# Patient Record
Sex: FEMALE | Race: WHITE | URBAN - METROPOLITAN AREA
[De-identification: names, ages, dates, MRNs, and addresses within clinical notes are randomized per-mention and may not be internally consistent; named-entity substitution may affect disease eponyms.]

---
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
## 2020-03-30 RX ORDER — NORGESTIMATE AND ETHINYL ESTRADIOL
KIT
Qty: 84 TABLET | Refills: 0 | OUTPATIENT
Start: 2020-03-30

## 2021-04-20 ENCOUNTER — IMMUNIZATION (OUTPATIENT)
Dept: LAB | Facility: HOSPITAL | Age: 21
End: 2021-04-20
Attending: EMERGENCY MEDICINE
Payer: COMMERCIAL

## 2021-04-20 DIAGNOSIS — Z23 NEED FOR VACCINATION: Primary | ICD-10-CM

## 2021-04-20 PROCEDURE — 0011A SARSCOV2 VAC 100MCG/0.5ML IM: CPT

## 2021-05-18 ENCOUNTER — IMMUNIZATION (OUTPATIENT)
Dept: LAB | Facility: HOSPITAL | Age: 21
End: 2021-05-18
Attending: EMERGENCY MEDICINE
Payer: COMMERCIAL

## 2021-05-18 DIAGNOSIS — Z23 NEED FOR VACCINATION: Primary | ICD-10-CM

## 2021-05-18 PROCEDURE — 0012A SARSCOV2 VAC 100MCG/0.5ML IM: CPT

## 2021-07-27 NOTE — PROGRESS NOTES
HPI/Subjective:   Patient ID: Hemant Mckinnon is a 21year old female. Patient with anxiety issues.  States that she has this for a long time would like to start counseling       History/Other:   Review of Systems   Constitutional: Positive for fatigue

## 2021-12-28 ENCOUNTER — IMMUNIZATION (OUTPATIENT)
Dept: LAB | Facility: HOSPITAL | Age: 21
End: 2021-12-28
Attending: EMERGENCY MEDICINE
Payer: COMMERCIAL

## 2021-12-28 DIAGNOSIS — Z23 NEED FOR VACCINATION: Primary | ICD-10-CM

## 2021-12-28 PROCEDURE — 0064A SARSCOV2 VAC 50MCG/0.25ML IM: CPT

## 2023-04-04 ENCOUNTER — OFFICE VISIT (OUTPATIENT)
Dept: FAMILY MEDICINE CLINIC | Facility: CLINIC | Age: 23
End: 2023-04-04

## 2023-04-04 VITALS
SYSTOLIC BLOOD PRESSURE: 114 MMHG | RESPIRATION RATE: 22 BRPM | HEART RATE: 72 BPM | DIASTOLIC BLOOD PRESSURE: 60 MMHG | HEIGHT: 64.5 IN | WEIGHT: 127 LBS | BODY MASS INDEX: 21.42 KG/M2

## 2023-04-04 DIAGNOSIS — L03.90 CELLULITIS, UNSPECIFIED CELLULITIS SITE: Primary | ICD-10-CM

## 2023-04-04 PROCEDURE — 3074F SYST BP LT 130 MM HG: CPT | Performed by: FAMILY MEDICINE

## 2023-04-04 PROCEDURE — 3008F BODY MASS INDEX DOCD: CPT | Performed by: FAMILY MEDICINE

## 2023-04-04 PROCEDURE — 3078F DIAST BP <80 MM HG: CPT | Performed by: FAMILY MEDICINE

## 2023-04-04 PROCEDURE — 99213 OFFICE O/P EST LOW 20 MIN: CPT | Performed by: FAMILY MEDICINE

## 2023-04-04 RX ORDER — CEFADROXIL 500 MG/1
500 CAPSULE ORAL 2 TIMES DAILY
Qty: 20 CAPSULE | Refills: 0 | Status: SHIPPED | OUTPATIENT
Start: 2023-04-04

## 2023-06-19 ENCOUNTER — OFFICE VISIT (OUTPATIENT)
Dept: FAMILY MEDICINE CLINIC | Facility: CLINIC | Age: 23
End: 2023-06-19

## 2023-06-19 VITALS
TEMPERATURE: 99 F | BODY MASS INDEX: 21 KG/M2 | WEIGHT: 123 LBS | DIASTOLIC BLOOD PRESSURE: 64 MMHG | HEIGHT: 64 IN | SYSTOLIC BLOOD PRESSURE: 106 MMHG

## 2023-06-19 DIAGNOSIS — M54.50 ACUTE LEFT-SIDED LOW BACK PAIN WITHOUT SCIATICA: ICD-10-CM

## 2023-06-19 DIAGNOSIS — L03.90 CELLULITIS, UNSPECIFIED CELLULITIS SITE: Primary | ICD-10-CM

## 2023-06-19 PROBLEM — G47.20 SLEEP PATTERN DISTURBANCE: Status: ACTIVE | Noted: 2022-08-31

## 2023-06-19 PROBLEM — M99.02 SEGMENTAL DYSFUNCTION OF THORACIC REGION: Status: ACTIVE | Noted: 2023-01-03

## 2023-06-19 PROBLEM — M99.01 SEGMENTAL DYSFUNCTION OF CERVICAL REGION: Status: ACTIVE | Noted: 2023-01-03

## 2023-06-19 PROBLEM — F41.9 ANXIETY: Status: ACTIVE | Noted: 2021-07-28

## 2023-06-19 PROCEDURE — 3078F DIAST BP <80 MM HG: CPT

## 2023-06-19 PROCEDURE — 3008F BODY MASS INDEX DOCD: CPT

## 2023-06-19 PROCEDURE — 99213 OFFICE O/P EST LOW 20 MIN: CPT

## 2023-06-19 PROCEDURE — 3074F SYST BP LT 130 MM HG: CPT

## 2023-06-19 RX ORDER — IBUPROFEN 600 MG/1
600 TABLET ORAL EVERY 6 HOURS PRN
Qty: 18 TABLET | Refills: 0 | Status: SHIPPED | OUTPATIENT
Start: 2023-06-19

## 2023-06-19 RX ORDER — CEFADROXIL 500 MG/1
500 CAPSULE ORAL 2 TIMES DAILY
Qty: 20 CAPSULE | Refills: 0 | Status: SHIPPED | OUTPATIENT
Start: 2023-06-19

## 2024-01-23 ENCOUNTER — OFFICE VISIT (OUTPATIENT)
Dept: FAMILY MEDICINE CLINIC | Facility: CLINIC | Age: 24
End: 2024-01-23
Payer: COMMERCIAL

## 2024-01-23 VITALS
RESPIRATION RATE: 18 BRPM | OXYGEN SATURATION: 97 % | TEMPERATURE: 98 F | DIASTOLIC BLOOD PRESSURE: 62 MMHG | WEIGHT: 123 LBS | BODY MASS INDEX: 21 KG/M2 | SYSTOLIC BLOOD PRESSURE: 108 MMHG | HEART RATE: 86 BPM | HEIGHT: 64 IN

## 2024-01-23 DIAGNOSIS — K11.20 SALIVARY GLAND INFECTION: Primary | ICD-10-CM

## 2024-01-23 DIAGNOSIS — Z87.2 HISTORY OF INFECTION OF SKIN OR SUBCUTANEOUS TISSUE: ICD-10-CM

## 2024-01-23 PROCEDURE — 3008F BODY MASS INDEX DOCD: CPT

## 2024-01-23 PROCEDURE — 3078F DIAST BP <80 MM HG: CPT

## 2024-01-23 PROCEDURE — 3074F SYST BP LT 130 MM HG: CPT

## 2024-01-23 PROCEDURE — 99213 OFFICE O/P EST LOW 20 MIN: CPT

## 2024-01-23 RX ORDER — AMOXICILLIN AND CLAVULANATE POTASSIUM 875; 125 MG/1; MG/1
1 TABLET, FILM COATED ORAL 2 TIMES DAILY
Qty: 14 TABLET | Refills: 0 | Status: SHIPPED | OUTPATIENT
Start: 2024-01-23 | End: 2024-01-30

## 2024-01-23 NOTE — PROGRESS NOTES
HPI:    Patient ID: Aleah Starkey is a 23 year old female.    HPI     Patient here in office with complaint of left jaw pain, rated 8/10, started on Tuesday shortly after smoking.  Also complains of jaw stiffness and pain when eating.  Denies dental caries.  Has tried acupuncture and oral massage with no improvement in symptoms.      Requesting prophylactic antibiotic for anticipated cellulitis s/p tattoo removal ( has upcoming appointment).  Was seen on 6/19/2023 and given antibiotics due to cellulitis during tattoo placement.        Review of Systems   Constitutional: Negative.  Negative for fever.   HENT:          Left jaw pain   Respiratory: Negative.     Cardiovascular: Negative.    Skin: Negative.    Neurological: Negative.    Psychiatric/Behavioral: Negative.              Current Outpatient Medications   Medication Sig Dispense Refill    SM MELATONIN-LEMON BALM OR Take by mouth.      MILK THISTLE OR Take by mouth.      L-THEANINE OR Take by mouth.      amoxicillin clavulanate 875-125 MG Oral Tab Take 1 tablet by mouth 2 (two) times daily for 7 days. 14 tablet 0    ibuprofen 600 MG Oral Tab Take 1 tablet (600 mg total) by mouth every 6 (six) hours as needed for Pain. 18 tablet 0    Emollient (COLLAGEN EX) Apply topically.      Ergocalciferol (VITAMIN D OR) Take by mouth.      Ascorbic Acid (VITAMIN C ER OR) Take by mouth.       Allergies:No Known Allergies   /62   Pulse 86   Temp 97.8 °F (36.6 °C) (Oral)   Resp 18   Ht 5' 4\" (1.626 m)   Wt 123 lb (55.8 kg)   LMP 01/14/2024   SpO2 97%   BMI 21.11 kg/m²   Body mass index is 21.11 kg/m².  PHYSICAL EXAM:   Physical Exam  Vitals reviewed.   Constitutional:       General: She is not in acute distress.     Appearance: Normal appearance. She is not ill-appearing.   HENT:      Right Ear: Tympanic membrane, ear canal and external ear normal. There is no impacted cerumen.      Left Ear: Tympanic membrane, ear canal and external ear normal. There is no  impacted cerumen.      Nose: Nose normal. No congestion.      Mouth/Throat:      Mouth: Mucous membranes are moist.      Pharynx: No oropharyngeal exudate or posterior oropharyngeal erythema.      Comments: Left parotid gland tenderness, no TMJ tenderness  Eyes:      General:         Right eye: No discharge.         Left eye: No discharge.      Conjunctiva/sclera: Conjunctivae normal.   Cardiovascular:      Rate and Rhythm: Normal rate and regular rhythm.      Heart sounds: Normal heart sounds. No murmur heard.     No friction rub. No gallop.   Pulmonary:      Effort: Pulmonary effort is normal. No respiratory distress.      Breath sounds: Normal breath sounds. No stridor. No wheezing, rhonchi or rales.   Chest:      Chest wall: No tenderness.   Musculoskeletal:      Cervical back: Neck supple.   Lymphadenopathy:      Cervical: No cervical adenopathy.   Skin:     General: Skin is warm.      Findings: No rash.   Neurological:      General: No focal deficit present.      Mental Status: She is alert and oriented to person, place, and time.   Psychiatric:         Mood and Affect: Mood normal.         Behavior: Behavior normal.         Thought Content: Thought content normal.         Judgment: Judgment normal.                ASSESSMENT/PLAN:   1. Salivary gland infection  - amoxicillin clavulanate 875-125 MG Oral Tab, Take 1 tablet by mouth 2 (two) times daily for 7 days.   -Return to office if symptoms worsening/not improving    2. History of infection of skin or subcutaneous tissue  -Advised patient that APRN unable to prescribe antibiotic due to infection not being present at the moment  - Encouraged patient to schedule office/video visit if infection of the skin develops s/p tattoo removal      No orders of the defined types were placed in this encounter.      Meds This Visit:  Requested Prescriptions     Signed Prescriptions Disp Refills    amoxicillin clavulanate 875-125 MG Oral Tab 14 tablet 0     Sig: Take 1  tablet by mouth 2 (two) times daily for 7 days.       Imaging & Referrals:  None         ID#[id number]

## 2024-01-29 ENCOUNTER — OFFICE VISIT (OUTPATIENT)
Dept: FAMILY MEDICINE CLINIC | Facility: CLINIC | Age: 24
End: 2024-01-29
Payer: COMMERCIAL

## 2024-01-29 VITALS
HEIGHT: 64 IN | BODY MASS INDEX: 21 KG/M2 | WEIGHT: 123 LBS | SYSTOLIC BLOOD PRESSURE: 126 MMHG | HEART RATE: 69 BPM | DIASTOLIC BLOOD PRESSURE: 68 MMHG | TEMPERATURE: 98 F

## 2024-01-29 DIAGNOSIS — R68.84 JAW PAIN: Primary | ICD-10-CM

## 2024-01-29 PROCEDURE — 3078F DIAST BP <80 MM HG: CPT

## 2024-01-29 PROCEDURE — 3074F SYST BP LT 130 MM HG: CPT

## 2024-01-29 PROCEDURE — 3008F BODY MASS INDEX DOCD: CPT

## 2024-01-29 PROCEDURE — 99213 OFFICE O/P EST LOW 20 MIN: CPT

## 2024-01-29 RX ORDER — IBUPROFEN 800 MG/1
800 TABLET ORAL EVERY 8 HOURS PRN
Qty: 15 TABLET | Refills: 0 | Status: SHIPPED | OUTPATIENT
Start: 2024-01-29

## 2024-01-29 NOTE — PROGRESS NOTES
HPI:    Patient ID: Aleah Starkey is a 23 year old female.    HPI     Patient here in office for follow-up regarding left jaw pain.  States left jaw pain not improved, rated 7-9/10 (described as soreness/stiffness).  Was seen on 1/23/2024 and given Augmentin for suspected salivary gland infection with no improvement in symptoms.  States she went to acupuncture, did not assist with pain.      Review of Systems   Constitutional: Negative.    HENT:          Left jaw pain   Respiratory: Negative.     Cardiovascular: Negative.    Skin: Negative.    Neurological: Negative.    Psychiatric/Behavioral: Negative.              Current Outpatient Medications   Medication Sig Dispense Refill    ibuprofen 800 MG Oral Tab Take 1 tablet (800 mg total) by mouth every 8 (eight) hours as needed for Pain. 15 tablet 0    SM MELATONIN-LEMON BALM OR Take by mouth.      MILK THISTLE OR Take by mouth.      L-THEANINE OR Take by mouth.      amoxicillin clavulanate 875-125 MG Oral Tab Take 1 tablet by mouth 2 (two) times daily for 7 days. 14 tablet 0    ibuprofen 600 MG Oral Tab Take 1 tablet (600 mg total) by mouth every 6 (six) hours as needed for Pain. 18 tablet 0    Emollient (COLLAGEN EX) Apply topically.      Ergocalciferol (VITAMIN D OR) Take by mouth.      Ascorbic Acid (VITAMIN C ER OR) Take by mouth.       Allergies:No Known Allergies   /68   Pulse 69   Temp 97.5 °F (36.4 °C) (Tympanic)   Ht 5' 4\" (1.626 m)   Wt 123 lb (55.8 kg)   LMP 01/14/2024   BMI 21.11 kg/m²   Body mass index is 21.11 kg/m².  PHYSICAL EXAM:   Physical Exam  Vitals reviewed.   Constitutional:       General: She is not in acute distress.     Appearance: Normal appearance. She is not ill-appearing.   Cardiovascular:      Rate and Rhythm: Normal rate and regular rhythm.      Heart sounds: Normal heart sounds. No murmur heard.     No friction rub. No gallop.   Pulmonary:      Effort: Pulmonary effort is normal. No respiratory distress.      Breath  sounds: Normal breath sounds. No stridor. No wheezing, rhonchi or rales.   Chest:      Chest wall: No tenderness.   Musculoskeletal:         General: Tenderness present.      Cervical back: Neck supple.      Comments: Left TMJ tenderness   Lymphadenopathy:      Cervical: No cervical adenopathy.   Skin:     General: Skin is warm.      Findings: No rash.   Neurological:      General: No focal deficit present.      Mental Status: She is alert and oriented to person, place, and time.   Psychiatric:         Mood and Affect: Mood normal.         Behavior: Behavior normal.         Thought Content: Thought content normal.         Judgment: Judgment normal.                ASSESSMENT/PLAN:   1. Jaw pain  -Take ibuprofen 800 MG Oral Tab, Take 1 tablet (800 mg total) by mouth every 8 (eight) hours as needed for Pain X 2-3 days  -ENT referral given if symptoms worsen/do not improve  - ENT Referral - In Network      No orders of the defined types were placed in this encounter.      Meds This Visit:  Requested Prescriptions     Signed Prescriptions Disp Refills    ibuprofen 800 MG Oral Tab 15 tablet 0     Sig: Take 1 tablet (800 mg total) by mouth every 8 (eight) hours as needed for Pain.       Imaging & Referrals:  ENT - INTERNAL         ID#[id number]

## 2024-02-06 ENCOUNTER — OFFICE VISIT (OUTPATIENT)
Dept: OTOLARYNGOLOGY | Facility: CLINIC | Age: 24
End: 2024-02-06
Payer: COMMERCIAL

## 2024-02-06 DIAGNOSIS — M26.609 TMJ (TEMPOROMANDIBULAR JOINT DISORDER): Primary | ICD-10-CM

## 2024-02-06 PROCEDURE — 99203 OFFICE O/P NEW LOW 30 MIN: CPT | Performed by: STUDENT IN AN ORGANIZED HEALTH CARE EDUCATION/TRAINING PROGRAM

## 2024-02-06 RX ORDER — CYCLOBENZAPRINE HCL 5 MG
5 TABLET ORAL NIGHTLY
Qty: 30 TABLET | Refills: 0 | Status: SHIPPED | OUTPATIENT
Start: 2024-02-06 | End: 2024-03-07

## 2024-02-06 RX ORDER — PREDNISONE 20 MG/1
40 TABLET ORAL DAILY
Qty: 10 TABLET | Refills: 0 | Status: SHIPPED | OUTPATIENT
Start: 2024-02-06 | End: 2024-02-11

## 2024-02-06 NOTE — PROGRESS NOTES
Aleah Starkey is a 23 year old female.   Chief Complaint   Patient presents with    Jaw Pain     C/o jaw pain and left jaw locking X 3 weeks        ASSESSMENT AND PLAN:   1. TMJ (temporomandibular joint disorder)  23-year-old presents saying that her left jaw has been stuck and locked for the last 3 weeks.  She tried an anti-inflammatory by an outside provider that did not help.  She was initially thought to have a possible salivary issue on the side and was put on oral antibiotics.  She states that when her anxiety works up her jaw issue worsens.  She has had needle therapy to her jaw as well    Exam with slight trismus she is still able to open her mouth but she says it is not as much as usual.  Slight tenderness of her left temporomandibular joint area.  No apparent salivary issue no apparent tonsillitis    Will prescribe a muscle relaxant and short course of oral steroids.  Discussed these medications with her including pertinent side effects.  She may be able to undergo physical therapy at the place where she was getting the dry needling performed.  If she needs an order she can have them fax over an order and I can sign it we can fax it back.  She continues to have jaw opening difficulties she would need to see an oral surgeon.    Consult from Dr. De La Cruz regarding jaw pain    The patient indicates understanding of these issues and agrees to the plan.      EXAM:   [facility] 01/14/2024     Pertinent exam findings may also be noted above in assessment and plan     System Details   Skin Inspection - Normal.   Constitutional Overall appearance - Normal.   Head/Face Symmetric, TMJ tenderness not present    Eyes EOMI, PERRL   Right ear:  Canal clear, TM intact, no ROCCO   Left ear:  Canal clear, TM intact, no ROCCO   Nose: Septum midline, inferior turbinates not enlarged, nasal valves without collapse    Oral cavity/Oropharynx: No lesions or masses on inspection or palpation, tonsils symmetric    Neck: Soft without LAD,  thyroid not enlarged  Voice clear/ no stridor   Other:      Scopes and Procedures:             Current Outpatient Medications   Medication Sig Dispense Refill    cyclobenzaprine 5 MG Oral Tab Take 1 tablet (5 mg total) by mouth nightly. 30 tablet 0    predniSONE 20 MG Oral Tab Take 2 tablets (40 mg total) by mouth daily for 5 days. 10 tablet 0    ibuprofen 800 MG Oral Tab Take 1 tablet (800 mg total) by mouth every 8 (eight) hours as needed for Pain. 15 tablet 0    SM MELATONIN-LEMON BALM OR Take by mouth.      MILK THISTLE OR Take by mouth.      L-THEANINE OR Take by mouth.      ibuprofen 600 MG Oral Tab Take 1 tablet (600 mg total) by mouth every 6 (six) hours as needed for Pain. 18 tablet 0    Emollient (COLLAGEN EX) Apply topically.      Ergocalciferol (VITAMIN D OR) Take by mouth.      Ascorbic Acid (VITAMIN C ER OR) Take by mouth.        History reviewed. No pertinent past medical history.   Social History:  Social History     Socioeconomic History    Marital status: Single   Tobacco Use    Smoking status: Never    Smokeless tobacco: Never   Substance and Sexual Activity    Alcohol use: No    Drug use: No   Other Topics Concern    Caffeine Concern No          Tyrone Bush MD  2/6/2024  4:56 PM

## 2024-02-08 ENCOUNTER — OFFICE VISIT (OUTPATIENT)
Dept: FAMILY MEDICINE CLINIC | Facility: CLINIC | Age: 24
End: 2024-02-08

## 2024-02-08 VITALS
WEIGHT: 122 LBS | DIASTOLIC BLOOD PRESSURE: 67 MMHG | SYSTOLIC BLOOD PRESSURE: 101 MMHG | OXYGEN SATURATION: 97 % | HEIGHT: 64 IN | HEART RATE: 101 BPM | RESPIRATION RATE: 20 BRPM | BODY MASS INDEX: 20.83 KG/M2

## 2024-02-08 DIAGNOSIS — S03.00XA DISLOCATION OF TEMPOROMANDIBULAR JOINT, INITIAL ENCOUNTER: Primary | ICD-10-CM

## 2024-02-08 PROCEDURE — 99213 OFFICE O/P EST LOW 20 MIN: CPT | Performed by: FAMILY MEDICINE

## 2024-02-08 PROCEDURE — 3078F DIAST BP <80 MM HG: CPT | Performed by: FAMILY MEDICINE

## 2024-02-08 PROCEDURE — 3074F SYST BP LT 130 MM HG: CPT | Performed by: FAMILY MEDICINE

## 2024-02-08 PROCEDURE — 3008F BODY MASS INDEX DOCD: CPT | Performed by: FAMILY MEDICINE

## 2024-02-08 NOTE — PROGRESS NOTES
Subjective:   Patient ID: Aleah Starkey is a 23 year old female.    HPI  Here as continues with jaw pain and inability to open the mouth   Did not respond to medications so far given     History/Other:   Review of Systems  Constitutional: Negative.  Negative for activity change, appetite change, diaphoresis and fatigue.   Heent see hpi   Respiratory: Negative.  Negative for apnea, cough, chest tightness and shortness of breath.    Cardiovascular: Negative.  Negative for chest pain, palpitations and leg swelling.   Gastrointestinal: Negative.  Negative for abdominal pain.   Skin: Negative.           Psychiatric/Behavioral: Negative.        Current Outpatient Medications   Medication Sig Dispense Refill    cyclobenzaprine 5 MG Oral Tab Take 1 tablet (5 mg total) by mouth nightly. 30 tablet 0    predniSONE 20 MG Oral Tab Take 2 tablets (40 mg total) by mouth daily for 5 days. 10 tablet 0    ibuprofen 800 MG Oral Tab Take 1 tablet (800 mg total) by mouth every 8 (eight) hours as needed for Pain. 15 tablet 0    SM MELATONIN-LEMON BALM OR Take by mouth.      MILK THISTLE OR Take by mouth.      L-THEANINE OR Take by mouth.      ibuprofen 600 MG Oral Tab Take 1 tablet (600 mg total) by mouth every 6 (six) hours as needed for Pain. 18 tablet 0    Emollient (COLLAGEN EX) Apply topically.      Ergocalciferol (VITAMIN D OR) Take by mouth.      Ascorbic Acid (VITAMIN C ER OR) Take by mouth.       Allergies:No Known Allergies    Objective:   Physical Exam  Constitutional:       Appearance: Normal appearance. She is well-developed.   Cardiovascular:      Rate and Rhythm: Normal rate and regular rhythm.      Heart sounds: Normal heart sounds.   Pulmonary:      Effort: Pulmonary effort is normal.      Breath sounds: Normal breath sounds.   Abdominal:      General: Bowel sounds are normal.      Palpations: Abdomen is soft.   Skin:     General: Skin is warm and dry.   Neurological:      Mental Status: She is alert and oriented to  person, place, and time.      Deep Tendon Reflexes: Reflexes are normal and symmetric.     Heent tenderness tmj inability to open the mouth    Assessment & Plan:   TMJ   Patient will come for acu treatment next week   'continue same meds for now    No orders of the defined types were placed in this encounter.      Meds This Visit:  Requested Prescriptions      No prescriptions requested or ordered in this encounter       Imaging & Referrals:  None

## 2024-02-13 ENCOUNTER — OFFICE VISIT (OUTPATIENT)
Dept: FAMILY MEDICINE CLINIC | Facility: CLINIC | Age: 24
End: 2024-02-13
Payer: COMMERCIAL

## 2024-02-13 VITALS
SYSTOLIC BLOOD PRESSURE: 97 MMHG | DIASTOLIC BLOOD PRESSURE: 63 MMHG | HEIGHT: 64 IN | HEART RATE: 98 BPM | WEIGHT: 122.81 LBS | BODY MASS INDEX: 20.97 KG/M2

## 2024-02-13 DIAGNOSIS — S03.00XS DISLOCATION OF TEMPOROMANDIBULAR JOINT, SEQUELA: Primary | ICD-10-CM

## 2024-02-13 PROBLEM — N94.6 DYSMENORRHEA: Status: ACTIVE | Noted: 2023-06-26

## 2024-02-13 PROBLEM — M25.519 SHOULDER PAIN: Status: ACTIVE | Noted: 2023-11-01

## 2024-02-13 PROBLEM — M54.2 NECK PAIN: Status: ACTIVE | Noted: 2023-07-17

## 2024-02-13 RX ORDER — AMOXICILLIN AND CLAVULANATE POTASSIUM 875; 125 MG/1; MG/1
1 TABLET, FILM COATED ORAL 2 TIMES DAILY
COMMUNITY

## 2024-02-13 RX ORDER — CEFADROXIL 500 MG/1
1 CAPSULE ORAL 2 TIMES DAILY
COMMUNITY

## 2024-02-13 NOTE — PROCEDURES
Patient tolerated procedure well in excess of 30 minutes was spent with patient   There was no complications all needles were removed.   Patient was advised to rest today and f/u in 1-2 weeks  Back elida points   TH and GB points   Ear points

## 2024-02-13 NOTE — PROGRESS NOTES
Subjective:   Patient ID: Alaeh Starkey is a 23 year old female.    HPI  Patient here due to pain in the TMJ that is there already ongoing for 1 month and not imporving at all   Seen by ent also   Here to try acu treatment  History/Other:   Review of Systems    Constitutional: Negative.  Negative for activity change, appetite change, diaphoresis and fatigue.   HEENT see hpi  Respiratory: Negative.  Negative for apnea, cough, chest tightness and shortness of breath.    Cardiovascular: Negative.  Negative for chest pain, palpitations and leg swelling.   Gastrointestinal: Negative.  Negative for abdominal pain.     Current Outpatient Medications   Medication Sig Dispense Refill    amoxicillin clavulanate 875-125 MG Oral Tab Take 1 tablet by mouth 2 (two) times daily.      cefadroxil 500 MG Oral Cap Take 1 capsule (500 mg total) by mouth 2 (two) times daily.      cyclobenzaprine 5 MG Oral Tab Take 1 tablet (5 mg total) by mouth nightly. 30 tablet 0    ibuprofen 800 MG Oral Tab Take 1 tablet (800 mg total) by mouth every 8 (eight) hours as needed for Pain. 15 tablet 0    SM MELATONIN-LEMON BALM OR Take by mouth.      MILK THISTLE OR Take by mouth.      L-THEANINE OR Take by mouth.      ibuprofen 600 MG Oral Tab Take 1 tablet (600 mg total) by mouth every 6 (six) hours as needed for Pain. 18 tablet 0    Emollient (COLLAGEN EX) Apply topically.      Ergocalciferol (VITAMIN D OR) Take by mouth.      Ascorbic Acid (VITAMIN C ER OR) Take by mouth.       Allergies:No Known Allergies    Objective:   Physical Exam  Constitutional:       Appearance: She is well-developed.   HENT:      Head:      Comments: Tenderness left TMJ   Cardiovascular:      Rate and Rhythm: Normal rate and regular rhythm.      Heart sounds: Normal heart sounds.   Pulmonary:      Effort: Pulmonary effort is normal.      Breath sounds: Normal breath sounds.   Abdominal:      General: Bowel sounds are normal.   Neurological:      Mental Status: She is  alert.      Deep Tendon Reflexes: Reflexes are normal and symmetric.         Assessment & Plan:   TMJ   Triual of acu   F/u next week     No orders of the defined types were placed in this encounter.      Meds This Visit:  Requested Prescriptions      No prescriptions requested or ordered in this encounter       Imaging & Referrals:  None

## 2024-02-20 ENCOUNTER — TELEPHONE (OUTPATIENT)
Dept: FAMILY MEDICINE CLINIC | Facility: CLINIC | Age: 24
End: 2024-02-20

## 2024-02-27 ENCOUNTER — PATIENT MESSAGE (OUTPATIENT)
Dept: FAMILY MEDICINE CLINIC | Facility: CLINIC | Age: 24
End: 2024-02-27

## 2024-02-27 DIAGNOSIS — M26.609 TMJ DYSFUNCTION: Primary | ICD-10-CM

## 2024-02-28 NOTE — TELEPHONE ENCOUNTER
From: Aleah Starkey  To: Gloria Ye  Sent: 2/27/2024 10:02 AM CST  Subject: lock jaw     can I get a referral for a chiropractor for my lock jaw. I saw them this week when I went for acupuncture at [facility] in [place]. Does HMO do referrals for chiropractors?

## 2024-03-04 ENCOUNTER — TELEPHONE (OUTPATIENT)
Dept: ADMINISTRATIVE | Age: 24
End: 2024-03-04

## 2024-03-04 NOTE — TELEPHONE ENCOUNTER
Hello    In order to submit this request to [facility] to determine if patient is eligible for an approved Chiropractic referral we will need the name of the provider the patient is being referred to.   Once updated, we will submit for review.   Thank you  Karrie

## 2024-03-04 NOTE — TELEPHONE ENCOUNTER
Called patient & left message to call back with name & phone number of chiropractor she is being referred to - needed in order to pend referral

## 2024-08-07 ENCOUNTER — TELEPHONE (OUTPATIENT)
Dept: INTERNAL MEDICINE UNIT | Facility: HOSPITAL | Age: 24
End: 2024-08-07

## 2024-10-22 RX ORDER — CEFADROXIL 500 MG/1
500 CAPSULE ORAL 2 TIMES DAILY
Qty: 14 CAPSULE | Refills: 0 | Status: SHIPPED | OUTPATIENT
Start: 2024-10-22

## 2024-10-28 ENCOUNTER — OFFICE VISIT (OUTPATIENT)
Dept: FAMILY MEDICINE CLINIC | Facility: CLINIC | Age: 24
End: 2024-10-28
Payer: COMMERCIAL

## 2024-10-28 VITALS
BODY MASS INDEX: 20.25 KG/M2 | HEART RATE: 65 BPM | RESPIRATION RATE: 18 BRPM | DIASTOLIC BLOOD PRESSURE: 63 MMHG | TEMPERATURE: 98 F | WEIGHT: 123 LBS | SYSTOLIC BLOOD PRESSURE: 99 MMHG | HEIGHT: 65.4 IN

## 2024-10-28 DIAGNOSIS — M26.609 TMJ DYSFUNCTION: Primary | ICD-10-CM

## 2024-10-28 DIAGNOSIS — L03.90 CELLULITIS, UNSPECIFIED CELLULITIS SITE: ICD-10-CM

## 2024-10-28 PROCEDURE — 99213 OFFICE O/P EST LOW 20 MIN: CPT

## 2024-10-28 PROCEDURE — 3008F BODY MASS INDEX DOCD: CPT

## 2024-10-28 PROCEDURE — 3078F DIAST BP <80 MM HG: CPT

## 2024-10-28 PROCEDURE — 3074F SYST BP LT 130 MM HG: CPT

## 2024-10-28 NOTE — PROGRESS NOTES
HPI:    Patient ID: Aleah Starkey is a 24 year old female.    HPI     Patient here in office for follow-up regarding left-sided jaw pain.  Was seen on 1/29/2024 for left jaw pain and given prescription for ibuprofen 800 mg as needed.  Per patient, ibuprofen provided mild relief for jaw pain.  States she also seen by ENT provider on 2/6/2024 and given cyclobenzaprine 5 mg nightly and prednisone 40 mg daily x 5 days with mild improvement.  Per patient, dentist was also consulted and x-rays completed of jaw (showed no abnormality of jaw/teeth).  States she is getting acupuncture and massage every other week to help loosen up jaw, providing mild improvement.  Also seeing chiropractor for adjustments with no improvement.  Interested in referral to oral surgeon to discuss possible Botox treatments.      Concerned about skin infection of right knee after tattoo.  Currently taking cefadroxil 500 mg twice daily x 7 days (almost done with medication).  States redness/pain has improved.      Review of Systems   Constitutional: Negative.    Respiratory: Negative.     Cardiovascular: Negative.    Musculoskeletal:         Left jaw pain   Skin: Negative.    Neurological: Negative.    Psychiatric/Behavioral: Negative.              Current Outpatient Medications   Medication Sig Dispense Refill    L-THEANINE OR Take by mouth.      Ergocalciferol (VITAMIN D OR) Take by mouth.      Ascorbic Acid (VITAMIN C ER OR) Take by mouth.      cefadroxil 500 MG Oral Cap Take 1 capsule (500 mg total) by mouth 2 (two) times daily. (Patient not taking: Reported on 10/28/2024) 14 capsule 0    SM MELATONIN-LEMON BALM OR Take by mouth. (Patient not taking: Reported on 10/28/2024)      MILK THISTLE OR Take by mouth. (Patient not taking: Reported on 10/28/2024)      ibuprofen 600 MG Oral Tab Take 1 tablet (600 mg total) by mouth every 6 (six) hours as needed for Pain. (Patient not taking: Reported on 10/28/2024) 18 tablet 0    Emollient (COLLAGEN  EX) Apply topically. (Patient not taking: Reported on 10/28/2024)       Allergies:Allergies[1]   BP 99/63   Pulse 65   Temp 97.9 °F (36.6 °C) (Temporal)   Resp 18   Ht 5' 5.4\" (1.661 m)   Wt 123 lb (55.8 kg)   LMP 04/27/2024 (Exact Date)   BMI 20.22 kg/m²   Body mass index is 20.22 kg/m².  PHYSICAL EXAM:   Physical Exam  Vitals reviewed.   Constitutional:       General: She is not in acute distress.     Appearance: Normal appearance. She is not ill-appearing.   Cardiovascular:      Rate and Rhythm: Normal rate and regular rhythm.      Heart sounds: Normal heart sounds. No murmur heard.     No friction rub. No gallop.   Pulmonary:      Effort: Pulmonary effort is normal. No respiratory distress.      Breath sounds: Normal breath sounds. No stridor. No wheezing, rhonchi or rales.   Chest:      Chest wall: No tenderness.   Skin:     General: Skin is warm.      Comments: Mild cutaneous swelling of skin near left knee/ tattoo, no erythema   Neurological:      General: No focal deficit present.      Mental Status: She is alert and oriented to person, place, and time.   Psychiatric:         Mood and Affect: Mood normal.         Behavior: Behavior normal.         Thought Content: Thought content normal.         Judgment: Judgment normal.                ASSESSMENT/PLAN:   1. TMJ dysfunction  -Advised patient to continue cyclobenzaprine as needed for jaw pain  Continue acupuncture/physical therapy/chiropractic adjustments  - Referral given for oral surgery to discuss possible Botox injections  - Oral Surgery Referral - In Network    2. Cellulitis, unspecified cellulitis site  -Finish cefadroxil as prescribed  - Return to office if symptoms worsening/not improving      No orders of the defined types were placed in this encounter.      Meds This Visit:  Requested Prescriptions      No prescriptions requested or ordered in this encounter       Imaging & Referrals:  ORAL AND MAXILLOFACIAL SURGERY - INTERNAL          ID#[id number]         [1] No Known Allergies

## 2025-04-30 RX ORDER — CEFADROXIL 500 MG/1
500 CAPSULE ORAL 2 TIMES DAILY
Qty: 14 CAPSULE | Refills: 0 | OUTPATIENT
Start: 2025-04-30

## 2025-06-22 ENCOUNTER — HOSPITAL ENCOUNTER (EMERGENCY)
Facility: HOSPITAL | Age: 25
Discharge: HOME | End: 2025-06-22
Attending: STUDENT IN AN ORGANIZED HEALTH CARE EDUCATION/TRAINING PROGRAM
Payer: COMMERCIAL

## 2025-06-22 VITALS
HEIGHT: 64 IN | TEMPERATURE: 99 F | SYSTOLIC BLOOD PRESSURE: 147 MMHG | DIASTOLIC BLOOD PRESSURE: 81 MMHG | BODY MASS INDEX: 20.83 KG/M2 | WEIGHT: 122 LBS | HEART RATE: 87 BPM | RESPIRATION RATE: 18 BRPM | OXYGEN SATURATION: 99 %

## 2025-06-22 DIAGNOSIS — S91.209A AVULSION OF TOENAIL, INITIAL ENCOUNTER: Primary | ICD-10-CM

## 2025-06-22 PROCEDURE — 11760 REPAIR OF NAIL BED: CPT

## 2025-06-22 PROCEDURE — 99283 EMERGENCY DEPT VISIT LOW MDM: CPT

## 2025-06-22 RX ORDER — LIDOCAINE HYDROCHLORIDE 10 MG/ML
20 INJECTION, SOLUTION EPIDURAL; INFILTRATION; INTRACAUDAL; PERINEURAL ONCE
Status: DISCONTINUED | OUTPATIENT
Start: 2025-06-22 | End: 2025-06-22

## 2025-06-22 RX ORDER — LIDOCAINE HYDROCHLORIDE 10 MG/ML
INJECTION, SOLUTION EPIDURAL; INFILTRATION; INTRACAUDAL; PERINEURAL
Status: COMPLETED
Start: 2025-06-22 | End: 2025-06-22

## 2025-06-22 RX ORDER — LIDOCAINE HYDROCHLORIDE 10 MG/ML
20 INJECTION, SOLUTION EPIDURAL; INFILTRATION; INTRACAUDAL; PERINEURAL ONCE
Status: COMPLETED | OUTPATIENT
Start: 2025-06-22 | End: 2025-06-22

## 2025-06-22 NOTE — ED PROVIDER NOTES
[place] Emergency Department Note  Patient: Aleah Starkey Age: 24 year old Sex: female      MRN: [id number]  : 10/10/2000    Patient Seen in: [facility] Emergency Department    History     Chief Complaint   Patient presents with    Trauma     Stated Complaint: toe pain s/p horse injury    History obtained from: Patient    Patient is a 24-year-old female presenting with left great toe nail avulsion.  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  Pain with range of motion.  Denies any numbness or tingling.  Denies any other injury.  States that this occurred at approximately 1330 this afternoon.    Review of Systems:  Review of Systems  Positive for stated complaint: toe pain s/p horse injury. Constitutional and vital signs reviewed. All other systems reviewed and negative except as noted above.    Patient History:  Past Medical History[1]    Past Surgical History[2]     Family History[3]    Specific Social Determinants of Health:   Short Social Hx on File[4]        PSFH elements reviewed from today and agreed except as otherwise stated in HPI.    Physical Exam     ED Triage Vitals [25 1447]   /81   Pulse 87   Resp 18   Temp 98.7 °F (37.1 °C)   Temp src Temporal   SpO2 99 %   O2 Device None (Room air)       Current:/81   Pulse 87   Temp 98.7 °F (37.1 °C) (Temporal)   Resp 18   Ht 162.6 cm (5' 4\")   Wt 55.3 kg   LMP 2025 (Exact Date)   SpO2 99%   BMI 20.94 kg/m²         Physical Exam  Constitutional:       General: She is not in acute distress.  HENT:      Head: Normocephalic and atraumatic.      Mouth/Throat:      Mouth: Mucous membranes are moist.   Eyes:      Extraocular Movements: Extraocular movements intact.   Cardiovascular:      Rate and Rhythm: Normal rate and regular rhythm.      Heart sounds: Normal heart sounds.   Pulmonary:      Effort: Pulmonary effort is normal. No respiratory distress.       Breath sounds: Normal breath sounds.   Abdominal:      Palpations: Abdomen is soft.      Tenderness: There is no abdominal tenderness.   Musculoskeletal:      Comments: Left great toe with full toenail avulsion.  Toenail is anchored to nailbed with paronychia skin on bilateral sides.  No underlying nailbed laceration.   Skin:     General: Skin is warm and dry.      Capillary Refill: Capillary refill takes less than 2 seconds.      Findings: No rash.   Neurological:      General: No focal deficit present.      Mental Status: She is alert and oriented to person, place, and time.   Psychiatric:         Mood and Affect: Mood normal.         Behavior: Behavior normal.         ED Course   Labs:   Labs Reviewed - No data to display  Radiology findings:  I personally reviewed the images.   No results found.        MDM   24-year-old female with no significant past medical history presenting today for evaluation of left great toe nail avulsion.  Upon arrival to the emergency department, well-appearing and in no acute distress.  Vitals are within normal limits.  Left great toe nail and nailbed were irrigated with copious amounts of sterile saline.  No evidence of gross contamination or obvious foreign body.  Digital block was applied with 1% lidocaine.  Nail was replaced into eponychial fold and tacked down with three 4-0 Ethilon sutures.  Dressed with antibiotic ointment, nonadherent gauze, and bulky dressing.  Discussed outpatient podiatry follow-up.  Discussed suture removal in 10 to 14 days.  Discussed expectant management of nail replacement.  Tylenol and ibuprofen as needed for pain.  Return precautions were discussed and all questions answered.  Patient expressed understanding and agreement with plan.    Differential diagnoses considered includes, but is not limited to: Toenail avulsion, laceration    Will obtain the following tests: None  Please see ED course for my independent review of these tests/imaging  results.    Initial Medications/Therapeutics administered: 1% lidocaine    Chronic conditions affecting care: None            Procedures:  Procedure: Laceration repair  Verbal consent was obtained from the patient.  The nail avulsion located in the left great toe was anesthetized in the usual fashio with digital block n. The wound was scrubbed, draped and explored.   There were no deep structures involved.  No connective tissue injury noted.  The nail was replaced in the nailbed and tacked down with three 4-0 Ethilon sutures.  The wound repair was simple.  The procedure was performed by myself.        Disposition and Plan     Clinical Impression:  1. Avulsion of toenail, initial encounter        Disposition:  Discharge    Follow-up:  Luz Elena Lux DPM  [address]  [address]  [facility] [id number] [phone]    Schedule an appointment as soon as possible for a visit in 1 week(s)  For wound re-check      Medications Prescribed:  Discharge Medication List as of 6/22/2025  4:24 PM            This note may have been created using voice dictation technology and may include inadvertent errors.      Sylwia Claudio MD  Emergency Medicine             [1] History reviewed. No pertinent past medical history.  [2] History reviewed. No pertinent surgical history.  [3]   Family History  Problem Relation Age of Onset    Hypertension Maternal Grandmother    [4]   Social History  Socioeconomic History    Marital status: Single   Tobacco Use    Smoking status: Never    Smokeless tobacco: Never   Vaping Use    Vaping status: Never Used   Substance and Sexual Activity    Alcohol use: No    Drug use: No   Other Topics Concern    Caffeine Concern No

## 2025-06-22 NOTE — ED INITIAL ASSESSMENT (HPI)
Pt to ER c/o right big toe pain following being stepped on by a horse at approximately 1330 today.

## 2025-06-22 NOTE — DISCHARGE INSTRUCTIONS
Thank you for seeking care at [facility] Emergency [facility].  You have been seen and evaluated after an injury that resulted in a laceration.     We reviewed the results from your visit in the emergency department.   Please read the instructions provided   If given prescriptions, take as instructed.    Please return to the emergency department or to your primary care doctor in 10-14 days to have your sutures/staples removed.     Return to the emergency department earlier if you develop fevers, if you see pus coming from the wound, or if you develop increasing redness around the wound as these can be signs of wound infection.     Please keep the wound covered in the provided dressing for the first 24 hours. After that, you may remove the dressing and wash the area with normal soap and water. Please avoid aggressive scrubbing as this may disrupt the sutures/staples. Please keep the area clean and dry. You can use normal bandages or gauze/tape as needed to keep the wound protected.   After the wound has healed, use sunscreen to avoid significant scarring.     Remember, your care process does not end after your visit today. Please follow-up with your doctor within 1-2 days for a follow-up check to ensure you are  improving, to see if you need any further evaluation/testing, or to evaluate for any alternate diagnoses.    Please return to the emergency department for any fevers, if you see pus coming from the wound, increasing redness, discoloration, increasing pain, wound  open, numbness, tingling or weakness, new or worsening symptoms as discussed as these could be signs of more serious medical illness.    We hope you feel better.

## 2025-06-24 ENCOUNTER — OFFICE VISIT (OUTPATIENT)
Dept: FAMILY MEDICINE CLINIC | Facility: CLINIC | Age: 25
End: 2025-06-24
Payer: COMMERCIAL

## 2025-06-24 ENCOUNTER — TELEPHONE (OUTPATIENT)
Facility: CLINIC | Age: 25
End: 2025-06-24

## 2025-06-24 VITALS
OXYGEN SATURATION: 99 % | DIASTOLIC BLOOD PRESSURE: 65 MMHG | HEIGHT: 64 IN | WEIGHT: 123 LBS | RESPIRATION RATE: 18 BRPM | TEMPERATURE: 98 F | SYSTOLIC BLOOD PRESSURE: 99 MMHG | HEART RATE: 68 BPM | BODY MASS INDEX: 21 KG/M2

## 2025-06-24 DIAGNOSIS — S91.209A AVULSION OF TOENAIL, INITIAL ENCOUNTER: Primary | ICD-10-CM

## 2025-06-24 PROCEDURE — 90715 TDAP VACCINE 7 YRS/> IM: CPT | Performed by: FAMILY MEDICINE

## 2025-06-24 PROCEDURE — 3074F SYST BP LT 130 MM HG: CPT | Performed by: FAMILY MEDICINE

## 2025-06-24 PROCEDURE — 99214 OFFICE O/P EST MOD 30 MIN: CPT | Performed by: FAMILY MEDICINE

## 2025-06-24 PROCEDURE — 3008F BODY MASS INDEX DOCD: CPT | Performed by: FAMILY MEDICINE

## 2025-06-24 PROCEDURE — 3078F DIAST BP <80 MM HG: CPT | Performed by: FAMILY MEDICINE

## 2025-06-24 PROCEDURE — 90471 IMMUNIZATION ADMIN: CPT | Performed by: FAMILY MEDICINE

## 2025-06-24 RX ORDER — IBUPROFEN 600 MG/1
600 TABLET, FILM COATED ORAL EVERY 6 HOURS PRN
COMMUNITY

## 2025-06-24 RX ORDER — CEFADROXIL 500 MG/1
500 CAPSULE ORAL 2 TIMES DAILY
Qty: 14 CAPSULE | Refills: 0 | Status: SHIPPED | OUTPATIENT
Start: 2025-06-24

## 2025-06-24 NOTE — TELEPHONE ENCOUNTER
Patient was referred to provider for avulsion of toenail.    Patient's dad stated that the stitches were supposed to come out within 10 days.    Please advise if patient can be seen before first available.

## 2025-06-24 NOTE — TELEPHONE ENCOUNTER
Patient seen in ER on 6/22 for toenail avulsion injury- Patient was given sutures for nail that they recommended removal in 10 days. Patient saw PCP today and they recommended podiatry follow-up. Patient was started on oral antibiotics. Please advise if we could see patient early next week on Monday or Tuesday for 10 day follow-up after ER visit

## 2025-06-24 NOTE — PROGRESS NOTES
Subjective:   Aleah Starkey is a 24 year old female who presents for ER F/U (Avulsion of left toenail )   Seen in ED and nail sutured back in place.     History/Other:    Chief Complaint Reviewed and Verified  Nursing Notes Reviewed and   Verified  Allergies Reviewed  Medications Reviewed  Problem List   Reviewed  OB Status Reviewed         Tobacco:  She has never smoked tobacco.    Current Medications[1]      Review of Systems:  Review of Systems   Constitutional: Negative.    Skin:         Left nail avulsion.  Bandaged.        Objective:   BP 99/65   Pulse 68   Temp 97.8 °F (36.6 °C) (Temporal)   Resp 18   Ht 5' 4\" (1.626 m)   Wt 123 lb (55.8 kg)   LMP 06/17/2025 (Approximate)   SpO2 99%   BMI 21.11 kg/m²  Estimated body mass index is 21.11 kg/m² as calculated from the following:    Height as of this encounter: 5' 4\" (1.626 m).    Weight as of this encounter: 123 lb (55.8 kg).  Physical Exam  Vitals reviewed.   Skin:     Comments: Left great toe bandage removed.  Nail in place three suture in place.  No extensive redness, swelling or discharge.   Distal sensation and pulses normal.            Assessment & Plan:   1. Avulsion of toenail, initial encounter (Primary)  -     Podiatry Referral  Other orders  -     TETANUS, DIPHTHERIA TOXOIDS AND ACELLULAR PERTUSIS VACCINE (TDAP), >7 YEARS, IM USE  -     Cefadroxil; Take 1 capsule (500 mg total) by mouth 2 (two) times daily.  Dispense: 14 capsule; Refill: 0  Tdap update.  See podiatry.  Looks stable at this time. Keep covered and conitnue to walk in stiff flat shoe      No follow-ups on file.    Yariel Mondragon DO, 6/24/2025, 12:46 PM        [1]   Current Outpatient Medications   Medication Sig Dispense Refill    cefadroxil 500 MG Oral Cap Take 1 capsule (500 mg total) by mouth 2 (two) times daily. 14 capsule 0    L-THEANINE OR Take by mouth.      Ergocalciferol (VITAMIN D OR) Take by mouth.      Ascorbic Acid (VITAMIN C ER OR) Take by mouth.       ibuprofen 600 MG Oral Tab Take 1 tablet (600 mg total) by mouth every 6 (six) hours as needed for Pain. (Patient not taking: Reported on 6/24/2025)

## 2025-06-25 NOTE — TELEPHONE ENCOUNTER
Attempted to call back father of patient- Went to busy signal    Called patient's mobile number and it went to father Abran. I informed him that we could get patient in next week with Dr Lux. I offered 1:30 on 7/1 and they accepted. Address provided for [place] office.

## 2025-07-01 ENCOUNTER — OFFICE VISIT (OUTPATIENT)
Dept: PODIATRY CLINIC | Facility: CLINIC | Age: 25
End: 2025-07-01
Payer: COMMERCIAL

## 2025-07-01 DIAGNOSIS — S91.209A AVULSION OF TOENAIL, INITIAL ENCOUNTER: Primary | ICD-10-CM

## 2025-07-01 PROCEDURE — 99204 OFFICE O/P NEW MOD 45 MIN: CPT | Performed by: PODIATRIST

## 2025-07-01 RX ORDER — CEFADROXIL 500 MG/1
500 CAPSULE ORAL 2 TIMES DAILY
Qty: 30 CAPSULE | Refills: 0 | Status: SHIPPED | OUTPATIENT
Start: 2025-07-01

## 2025-07-01 NOTE — PROGRESS NOTES
Reason for Visit      Aleah Starkey is a 24 year old female presents today complaining of right hallux injury.     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  Nail was sutured back onto the skin with 4-0 Ethilon suture.  Patient states that she has no pain but an x-ray is needed.  Patient states they did not give her an antibiotic or tetanus update though she states that she is up-to-date with her vaccinations and tetanus.    The following portions of the patient's history were reviewed and updated as appropriate: allergies, current medications, past family history, past medical history, past social history, past surgical history and problem list.    Allergies[1]    Medications - Current[2]    There are no discontinued medications.    Problem List[3]    Past Medical History[4]    Past Surgical History[5]    Family History[6]    Social History     Occupational History    Not on file   Tobacco Use    Smoking status: Never    Smokeless tobacco: Never   Vaping Use    Vaping status: Never Used   Substance and Sexual Activity    Alcohol use: No    Drug use: No    Sexual activity: Not on file       ROS      Constitutional: negative for chills, fevers and sweats  Gastrointestinal: negative for abdominal pain, diarrhea, nausea and vomiting  Genitourinary:negative for dysuria and hematuria  Musculoskeletal:negative for arthralgias and muscle weakness  Neurological: negative for paresthesia and weakness  All others reviewed and negative.      Physical Exam     LE PHYSICAL EXAM    Constitution: Well-developed and well-nourished. Gait appears normal. No apparent distress. Alert and oriented to person, place, and time.  Integument: There are no varicosities. Skin appears moist, warm, and supple with positive hair growth. There are no color changes. No open  lesions. No macerations, No Hyperkeratotic lesions.  Vascular examination: Dorsalis pedis and posterior tibial pulses are strong bilaterally with capillary filling time less than 3 seconds to all digits. There is no peripheral edema..  Neurological Sensorium: Grossly intact to sharp/dull. Vibratory: Intact.  Musculoskeletal:   5/5 pedal muscle strength b/l     Nail plate sutured onto nailbed with 3 sutures.  No edema erythema or signs of infection noted.  Neurovascular status intact to digits 1 through 5 right  Vitals: LMP 06/17/2025 (Approximate)       Assessment and Plan     Encounter Diagnoses   Name Primary?    Avulsion of toenail, initial encounter Yes   Remove sutures at today's office visit.  Discussed that she will lose her nail at some point.  Recommend soaking in warm water and Epsom salt.  Also prescribed an antibiotic in case she sees signs of infection.  Discussed allowing the nail to fall off his own versus may removing it today.  Will start conservatively at this time.  - Sutures were removed and patient advised to begin soaking it daily.    Patient was instructed to call the office or on-call podiatric physician immediately with any issues or concerns before the next scheduled visit. Patient to follow-up in clinic in as needed      Luz Elena Garcias DPM, PRISCILLA.BRIANA, FACKATIE  Diplomat, American Board of Foot and Ankle Surgery  Certified in Foot and Rearfoot/Ankle Reconstruction  Fellow of the American College of Foot and Ankle Surgeons  Fellowship Trained Foot and Ankle Surgeon   [facility]     7/1/2025    2:31 PM       [1] No Known Allergies  [2]   Current Outpatient Medications:     ibuprofen 600 MG Oral Tab, Take 1 tablet (600 mg total) by mouth every 6 (six) hours as needed for Pain., Disp: , Rfl:     cefadroxil 500 MG Oral Cap, Take 1 capsule (500 mg total) by mouth 2 (two) times daily., Disp: 14 capsule, Rfl: 0    L-THEANINE OR, Take by mouth., Disp: , Rfl:     Ergocalciferol  (VITAMIN D OR), Take by mouth., Disp: , Rfl:     Ascorbic Acid (VITAMIN C ER OR), Take by mouth., Disp: , Rfl:   [3]   Patient Active Problem List  Diagnosis    Abdominal pain    Abnormal loss of weight    Acute sinusitis    Allergic rhinitis    Allergic state    Anxiety    Cellulitis and abscess of leg    Dermatitis    Low back pain    Segmental dysfunction of cervical region    Segmental dysfunction of thoracic region    Sleep pattern disturbance    Viral infection    Dysmenorrhea    Neck pain    Shoulder pain   [4] No past medical history on file.  [5] No past surgical history on file.  [6]   Family History  Problem Relation Age of Onset    Hypertension Maternal Grandmother

## 2025-07-02 RX ORDER — CEFADROXIL 500 MG/1
500 CAPSULE ORAL 2 TIMES DAILY
Qty: 30 CAPSULE | Refills: 0 | OUTPATIENT
Start: 2025-07-02

## (undated) NOTE — LETTER
AUTHORIZATION FOR SURGICAL OPERATION OR OTHER PROCEDURE    1. I hereby authorize Dr. Gloria Grier, and [facility] staff assigned to my case to perform the following operation and/or procedure at the [facility]:    _______________________________________________________________________________________________    Acupuncture  _______________________________________________________________________________________________    2.  My physician has explained the nature and purpose of the operation or other procedure, possible alternative methods of treatment, the risks involved, and the possibility of complication to me.  I acknowledge that no guarantee has been made as to the result that may be obtained.  3.  I recognize that, during the course of this operation, or other procedure, unforseen conditions may necessitate additional or different procedure than those listed above.  I, therefore, further authorize and request that the above named physician, his/her physician assistants or designees perform such procedures as are, in his/her professional opinion, necessary and desirable.  4.  Any tissue or organs removed in the operation or other procedure may be disposed of by and at the discretion of the [facility] and [facility].  5.  I understand that in the event of a medical emergency, I will be transported by local paramedics to [facility] or other hospital emergency department.  6.  I certify that I have read and fully understand the above consent to operation and/or other procedure.    7.  I acknowledge that my physician has explained sedation/analgesia administration to me including the risks and benefits.  I consent to the administration of sedation/analgesia as may be necessary or desirable in the judgement of my physician.    Witness signature: ___________________________________________________ Date:  ______/______/_____                    Time:  ________ A.M.   P.M.       Patient Name:  ______________________________________________________  (please print)      Patient signature:  ___________________________________________________             Relationship to Patient:           []  Parent    Responsible person                          []  Spouse  In case of minor or                    [] Other  _____________   Incompetent name:  __________________________________________________                               (please print)      _____________      Responsible person  In case of minor or  Incompetent signature:  _______________________________________________    Statement of Physician  My signature below affirms that prior to the time of the procedure, I have explained to the patient and/or his/her guardian, the risks and benefits involved in the proposed treatment and any reasonable alternative to the proposed treatment.  I have also explained the risks and benefits involved in the refusal of the proposed treatment and have answered the patient's questions.                        Date:  ______/______/_______  Provider                      Signature:  __________________________________________________________       Time:  ___________ A.M    P.M.

## (undated) NOTE — LETTER
VACCINE ADMINISTRATION RECORD  PARENT / GUARDIAN APPROVAL  Date: 10/4/2018  Vaccine administered to: Matias Skelton     : 10/10/2000    MRN: [id number]    A copy of the appropriate Centers for Disease Control and Prevention Vaccine Information statem